# Patient Record
Sex: FEMALE | Race: WHITE | Employment: FULL TIME | URBAN - NONMETROPOLITAN AREA
[De-identification: names, ages, dates, MRNs, and addresses within clinical notes are randomized per-mention and may not be internally consistent; named-entity substitution may affect disease eponyms.]

---

## 2020-02-25 ENCOUNTER — HOSPITAL ENCOUNTER (INPATIENT)
Age: 45
LOS: 3 days | Discharge: HOME | DRG: 885 | End: 2020-02-28
Attending: PSYCHIATRY & NEUROLOGY | Admitting: PSYCHIATRY & NEUROLOGY
Payer: COMMERCIAL

## 2020-02-25 PROBLEM — F14.90 COCAINE USE: Status: ACTIVE | Noted: 2020-02-25

## 2020-02-25 PROBLEM — F33.2 SEVERE EPISODE OF RECURRENT MAJOR DEPRESSIVE DISORDER, WITHOUT PSYCHOTIC FEATURES (HCC): Status: ACTIVE | Noted: 2020-02-25

## 2020-02-25 PROBLEM — T39.1X2A INTENTIONAL ACETAMINOPHEN OVERDOSE (HCC): Status: RESOLVED | Noted: 2020-02-25 | Resolved: 2020-02-25

## 2020-02-25 PROBLEM — T42.4X2A INTENTIONAL BENZODIAZEPINE OVERDOSE (HCC): Status: ACTIVE | Noted: 2020-02-25

## 2020-02-25 PROBLEM — F33.1 MODERATE EPISODE OF RECURRENT MAJOR DEPRESSIVE DISORDER (HCC): Status: ACTIVE | Noted: 2020-02-25

## 2020-02-25 PROBLEM — T39.1X2A INTENTIONAL ACETAMINOPHEN OVERDOSE (HCC): Status: ACTIVE | Noted: 2020-02-25

## 2020-02-25 PROCEDURE — 6370000000 HC RX 637 (ALT 250 FOR IP): Performed by: NURSE PRACTITIONER

## 2020-02-25 PROCEDURE — 90792 PSYCH DIAG EVAL W/MED SRVCS: CPT | Performed by: NURSE PRACTITIONER

## 2020-02-25 PROCEDURE — 4500000002 HC ER NO CHARGE

## 2020-02-25 PROCEDURE — 1240000000 HC EMOTIONAL WELLNESS R&B

## 2020-02-25 RX ORDER — HYDROXYZINE PAMOATE 25 MG/1
25 CAPSULE ORAL 3 TIMES DAILY PRN
Status: DISCONTINUED | OUTPATIENT
Start: 2020-02-25 | End: 2020-02-28 | Stop reason: HOSPADM

## 2020-02-25 RX ORDER — DULOXETIN HYDROCHLORIDE 20 MG/1
20 CAPSULE, DELAYED RELEASE ORAL DAILY
Status: DISCONTINUED | OUTPATIENT
Start: 2020-02-25 | End: 2020-02-26

## 2020-02-25 RX ORDER — POLYETHYLENE GLYCOL 3350 17 G/17G
17 POWDER, FOR SOLUTION ORAL DAILY PRN
Status: DISCONTINUED | OUTPATIENT
Start: 2020-02-25 | End: 2020-02-28 | Stop reason: HOSPADM

## 2020-02-25 RX ORDER — DOXEPIN HYDROCHLORIDE 25 MG/1
25 CAPSULE ORAL NIGHTLY PRN
Status: DISCONTINUED | OUTPATIENT
Start: 2020-02-25 | End: 2020-02-28 | Stop reason: HOSPADM

## 2020-02-25 RX ORDER — ACETAMINOPHEN 325 MG/1
650 TABLET ORAL EVERY 4 HOURS PRN
Status: DISCONTINUED | OUTPATIENT
Start: 2020-02-25 | End: 2020-02-28 | Stop reason: HOSPADM

## 2020-02-25 RX ADMIN — DULOXETINE HYDROCHLORIDE 20 MG: 20 CAPSULE, DELAYED RELEASE ORAL at 15:07

## 2020-02-25 RX ADMIN — ESTROGENS, CONJUGATED 0.45 MG: 0.3 TABLET, FILM COATED ORAL at 17:19

## 2020-02-25 RX ADMIN — DOXEPIN HYDROCHLORIDE 25 MG: 25 CAPSULE ORAL at 21:48

## 2020-02-25 ASSESSMENT — SLEEP AND FATIGUE QUESTIONNAIRES
DO YOU HAVE DIFFICULTY SLEEPING: YES
DIFFICULTY ARISING: YES
DO YOU USE A SLEEP AID: YES
RESTFUL SLEEP: NO
DIFFICULTY STAYING ASLEEP: YES
AVERAGE NUMBER OF SLEEP HOURS: 4
DIFFICULTY FALLING ASLEEP: YES

## 2020-02-25 ASSESSMENT — LIFESTYLE VARIABLES: HISTORY_ALCOHOL_USE: YES

## 2020-02-25 ASSESSMENT — PATIENT HEALTH QUESTIONNAIRE - PHQ9: SUM OF ALL RESPONSES TO PHQ QUESTIONS 1-9: 18
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
## 2020-02-25 NOTE — PLAN OF CARE
Group Therapy Note     Date: 2/25/2020  Start Time: 9249  End Time:  1600  Number of Participants: 3     Type of Group: Recovery     Wellness Binder Information  Module Name:  relapse prevention  Session Number:  2     Patient's Goal:  identifying early warning signs     Notes:  pt acknowledged negative thinking can be an early warning signs to help prevent relapse.      Status After Intervention:  Improved     Participation Level: Interactive     Participation Quality: Appropriate, Attentive, and Sharing        Speech:  normal        Thought Process/Content: Logical        Affective Functioning: Congruent        Mood: congruent        Level of consciousness:  Alert, Oriented x4, and Attentive        Response to Learning: Able to verbalize current knowledge/experience        Endings: None Reported     Modes of Intervention: Education        Discipline Responsible: Psychoeducational Specialist        Signature:  Kota Wing

## 2020-02-25 NOTE — PLAN OF CARE
Group Therapy Note     Date: 2/25/2020  Start Time: 1430  End Time:  0429  Number of Participants: 3     Type of Group: Cognitive Skills     Wellness Binder Information  Module Name:  emotional wellness  Session Number:  1     Patient's Goal:  validation of feelings     Notes:  pt acknowledged to have feelings validated it may be necessary to share feeling with others.      Status After Intervention:  Improved     Participation Level: Interactive     Participation Quality: Appropriate, Attentive, and Sharing        Speech:  normal        Thought Process/Content: Logical        Affective Functioning: Congruent        Mood: congruent        Level of consciousness:  Alert, Oriented x4, and Attentive        Response to Learning: Able to verbalize current knowledge/experience        Endings: None Reported     Modes of Intervention: Education        Discipline Responsible: Psychoeducational Specialist        Signature:  Chava Michelle

## 2020-02-26 LAB
TSH REFLEX FT4: 1.41 UIU/ML (ref 0.35–5.5)
VITAMIN B-12: 545 PG/ML (ref 211–946)
VITAMIN D 25-HYDROXY: 26.7 NG/ML

## 2020-02-26 PROCEDURE — 1240000000 HC EMOTIONAL WELLNESS R&B

## 2020-02-26 PROCEDURE — 6370000000 HC RX 637 (ALT 250 FOR IP): Performed by: FAMILY MEDICINE

## 2020-02-26 PROCEDURE — 99231 SBSQ HOSP IP/OBS SF/LOW 25: CPT | Performed by: NURSE PRACTITIONER

## 2020-02-26 PROCEDURE — 82306 VITAMIN D 25 HYDROXY: CPT

## 2020-02-26 PROCEDURE — 84443 ASSAY THYROID STIM HORMONE: CPT

## 2020-02-26 PROCEDURE — 82607 VITAMIN B-12: CPT

## 2020-02-26 PROCEDURE — 6370000000 HC RX 637 (ALT 250 FOR IP): Performed by: NURSE PRACTITIONER

## 2020-02-26 PROCEDURE — 36415 COLL VENOUS BLD VENIPUNCTURE: CPT

## 2020-02-26 PROCEDURE — 6370000000 HC RX 637 (ALT 250 FOR IP): Performed by: PSYCHIATRY & NEUROLOGY

## 2020-02-26 RX ORDER — IBUPROFEN 400 MG/1
400 TABLET ORAL EVERY 6 HOURS PRN
Status: DISCONTINUED | OUTPATIENT
Start: 2020-02-26 | End: 2020-02-28 | Stop reason: HOSPADM

## 2020-02-26 RX ORDER — ERGOCALCIFEROL 1.25 MG/1
50000 CAPSULE ORAL WEEKLY
Status: DISCONTINUED | OUTPATIENT
Start: 2020-02-26 | End: 2020-02-28 | Stop reason: HOSPADM

## 2020-02-26 RX ORDER — DULOXETIN HYDROCHLORIDE 30 MG/1
30 CAPSULE, DELAYED RELEASE ORAL DAILY
Status: DISCONTINUED | OUTPATIENT
Start: 2020-02-27 | End: 2020-02-28 | Stop reason: HOSPADM

## 2020-02-26 RX ADMIN — DULOXETINE HYDROCHLORIDE 20 MG: 20 CAPSULE, DELAYED RELEASE ORAL at 08:08

## 2020-02-26 RX ADMIN — ESTROGENS, CONJUGATED 0.45 MG: 0.3 TABLET, FILM COATED ORAL at 08:08

## 2020-02-26 RX ADMIN — DOXEPIN HYDROCHLORIDE 25 MG: 25 CAPSULE ORAL at 21:12

## 2020-02-26 RX ADMIN — ERGOCALCIFEROL 50000 UNITS: 1.25 CAPSULE ORAL at 17:36

## 2020-02-26 RX ADMIN — ACETAMINOPHEN 650 MG: 325 TABLET ORAL at 09:39

## 2020-02-26 RX ADMIN — HYDROXYZINE PAMOATE 25 MG: 25 CAPSULE ORAL at 21:12

## 2020-02-26 ASSESSMENT — PAIN SCALES - GENERAL
PAINLEVEL_OUTOF10: 4
PAINLEVEL_OUTOF10: 2

## 2020-02-26 NOTE — PROGRESS NOTES
Group Therapy Note    Start Time: 964  End Time:  900  Number of Participants: 8    Type of Group: Community Meeting       Patient's Goal:  \"being and thinking more positive\"      Notes:      Participation Level:  Active Listener       Participation Quality: Appropriate      Thought Process/Content: Logical      Affective Functioning: Congruent      Mood: calm      Level of consciousness:  Alert      Modes of Intervention: Support      Discipline Responsible: Behavioral Health Tech II      Signature:  Ruthe Brunner

## 2020-02-26 NOTE — PROGRESS NOTES
[facility] Adult Unit Daily Assessment  Nursing Progress Note    Room: [facility]/607-01   Name: Karen Vo   Age: 40 y.o. Gender: female   Dx: Severe episode of recurrent major depressive disorder, without psychotic features ([facility] [address]  Precautions: suicide risk  Inpatient Status: voluntary       SLEEP:    Sleep Quality Fair  Sleep Medications: Yes   PRN Sleep Meds: No       MEDICAL:    Other PRN Meds: No   Med Compliant: Yes  Accu-Chek: No  Oxygen/CPAP/BiPAP: No  CIWA/CINA: No   PAIN Assessment: none, not receiving treatment  Side Effects from medication: No      PSYCH:    Depression: 3   Anxiety: 3   SI denies suicidal ideation   HI Negative for homicidal ideation      AVH:Absent      GENERAL:    Appetite: good    Social: Yes, sit out in day area talking with peer  Speech: normal   Appearance: appropriately dressed    GROUP:    Group Participation: Yes  Participation Quality: Active Listener    Notes: Patient has been sitting in day area with peer, interacts with peer and talking with peer. Patient is calm, cooperative during interview, makes good eye contact and answers questions appropriately. Patient arrived on unit earlier this am and reports that prior to admission she had not been sleeping well, that she took Ambien to help with sleep. Patient offered education on medication Doxepin, talked with patient in regards to medication. Overall patient is pleasant and cooperative, complies with medication regime, voices no concerns at this time.       Electronically signed by Bridget Marte LPN on 1/32/00 at 31:74 PM

## 2020-02-26 NOTE — PROGRESS NOTES
Requirement Note     SW met with pt to complete Psychosocial and CSSR-S on this date. Patients long and short term goals discussed. Pt voiced understanding. Treatment plan sheet signed. Pt verbalized understanding of the treatment plan. Pt participated in goals and objectives of the treatment plan. Pt completed safety plan with , pt received copy of plan, and original was placed into pt's chart. SW explained treatment goals with pt. Decreasing depression and anxiety by improvement of positive coping patterns was discussed. Pt acknowledged understanding of treatment goals and signed treatment plan signature sheet. In the last 6 months has the pt been a danger to self: YES  In the last 6 months has the pt been a danger to others: NO  Legal Guardian/POA: NO     Provided pt with [employer] Online handout entitled \"Quitting Smoking. \"  Reviewed handout with pt addressing dangers of smoking, developing coping skills, and providing basic information about quitting. Patient declined practical counseling of tobacco practical counseling during the hospital stay.
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
## 2020-02-26 NOTE — PROGRESS NOTES
Patient is calm and cooperative with care. Patient is social and attends groups. Patient is compliant with medications. Patient completed ADLs. Patient's appetite is good. Patient reported she slept well. Patient rates depression as a 3 and anxiety as 1 on the 0-10 scale. Patient denies SI, HI, and AVH.

## 2020-02-26 NOTE — PLAN OF CARE
Problem: Discharge Planning:  Goal: Discharged to appropriate level of care  Description  Discharged to appropriate level of care  Outcome: Ongoing     Problem: Health Maintenance - Impaired:  Goal: Ability to perform activities of daily living will improve  Description  Ability to perform activities of daily living will improve  Outcome: Ongoing  Goal: Able to sleep without medication for appropriate length of time  Description  Able to sleep without medication for appropriate length of time  Outcome: Ongoing  Goal: Maintenance of adequate nutrition will improve  Description  Maintenance of adequate nutrition will improve  Outcome: Ongoing     Problem: Mood - Altered:  Goal: Mood stable  Description  Mood stable  Outcome: Ongoing     Problem: Health Behavior:  Goal: Ability to verbalize adaptive coping strategies to use when suicidal feelings occur will improve  Description  Ability to verbalize adaptive coping strategies to use when suicidal feelings occur will improve  Outcome: Ongoing  Goal: Ability to verbalize adaptive coping strategies to use when the urge to self-mutilate occurs will improve  Description  Ability to verbalize adaptive coping strategies to use when the urge to self-mutilate occurs will improve  Outcome: Ongoing     Problem: Safety:  Goal: Ability to contract for his/her safety will improve  Description  Ability to contract for his/her safety will improve  Outcome: Ongoing
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
## 2020-02-27 PROCEDURE — 6370000000 HC RX 637 (ALT 250 FOR IP): Performed by: NURSE PRACTITIONER

## 2020-02-27 PROCEDURE — 1240000000 HC EMOTIONAL WELLNESS R&B

## 2020-02-27 PROCEDURE — 99231 SBSQ HOSP IP/OBS SF/LOW 25: CPT | Performed by: NURSE PRACTITIONER

## 2020-02-27 RX ADMIN — HYDROXYZINE PAMOATE 25 MG: 25 CAPSULE ORAL at 20:50

## 2020-02-27 RX ADMIN — DOXEPIN HYDROCHLORIDE 25 MG: 25 CAPSULE ORAL at 20:51

## 2020-02-27 RX ADMIN — ESTROGENS, CONJUGATED 0.45 MG: 0.3 TABLET, FILM COATED ORAL at 08:49

## 2020-02-27 RX ADMIN — DULOXETINE HYDROCHLORIDE 30 MG: 30 CAPSULE, DELAYED RELEASE ORAL at 08:49

## 2020-02-27 RX ADMIN — IBUPROFEN 400 MG: 400 TABLET ORAL at 20:50

## 2020-02-27 ASSESSMENT — PAIN SCALES - GENERAL: PAINLEVEL_OUTOF10: 3

## 2020-02-27 NOTE — PROGRESS NOTES
Group Note    Number of Participants in Group: 3  Number of Patients on Unit:9      Patient attended group:Yes  Reason for Absence:  Intervention for patient absence:        Type of Group:   Wrap-Up/Relaxation    Patient's Goal: See wrap up group sheet    Participation Level: Active listener and interactive          Patient Response to Learning: Yes    Patient's Behavior: Pleasant    Is Patient Social/Interacting: Yes    Relaxation:   Television:Yes   Reading:No   Game/Puzzle:No         Notes/Comments: pt sat and socialized with peers as well and also had slight concerns about another pt.  Reassured pt as well with the others and pt took it well and said felt safer       Please see patient's wrap up group sheet for patient's comments       Electronically signed by Catia De La Fuente on 2/26/20 at 9:47 PM

## 2020-02-27 NOTE — PLAN OF CARE
Problem: Health Behavior:  Goal: Ability to verbalize adaptive coping strategies to use when suicidal feelings occur will improve  Description  Ability to verbalize adaptive coping strategies to use when suicidal feelings occur will improve  2/27/2020 1232 by Michelle Vargas  Outcome: Ongoing  Note:                                                                       Group Therapy Note    Date: 2/27/2020  Start Time: 1000  End Time:  0693  Number of Participants: 7    Type of Group: Psychoeducation    Wellness Binder Information  Module Name:  Men's Issues  Session Number:  1    Group Goal for Pt: To improve knowledge of effective stress management techniques    Notes:  Pt demonstrated improved knowledge of effective stress management techniques by actively participating in group discussion.     Status After Intervention:  Unchanged    Participation Level: Interactive    Participation Quality: Attentive      Speech:  normal      Thought Process/Content: Logical      Affective Functioning: Congruent      Mood: anxious and depressed      Level of consciousness:  Alert and Oriented x4      Response to Learning: Able to verbalize current knowledge/experience, Able to verbalize/acknowledge new learning, and Progressing to goal      Endings: None Reported    Modes of Intervention: Education      Discipline Responsible: Psychoeducational Specialist      Signature:  Michelle Vargas

## 2020-02-27 NOTE — PROGRESS NOTES
appetite as \"it is always good. \" Patient reports no side effects from medications. Previous Psychiatric/Substance Use History      Medical History:  No past medical history on file. GUZMAN History:   Social History     Substance and Sexual Activity   Alcohol Use Not on file         Social History     Substance and Sexual Activity   Drug Use Not on file        Social History     Tobacco Use   Smoking Status Not on file        Family History:     No family history on file. Vital Signs:  Last set of tests and vitals:  Vitals:    02/27/20 0724   BP: 113/66   Pulse: 75   Resp: 14   Temp: 97 °F (36.1 °C)   SpO2: 98%          Mental Status:  Level of consciousness:  within normal limits and awake  Appearance:  well-appearing, street clothes, in chair, good grooming and good hygiene  Behavior/Motor:  no abnormalities noted  Attitude toward examiner:  cooperative, attentive and good eye contact  Speech:  normal rate and normal volume  Mood:  \"I am feeling better today. \"  Affect:  mood congruent  Thought processes:  linear and goal directed  Thought content:  Homocidal ideation :denies  Suicidal Ideation:  denies suicidal ideation  Delusions:  no evidence of delusions  Perceptual Disturbance:  denies any perceptual disturbance  Cognition:  oriented to person, place, and time  Concentration : fair  Memory intact for recent and remote  Fund of knowledge:  average  Abstract thinking:  adequate  Insight: good/improved  Judgment:  good     vitamin D  50,000 Units Oral Weekly    DULoxetine  30 mg Oral Daily    estrogens (conjugated)  0.45 mg Oral Daily       Current Medications:  Current Facility-Administered Medications   Medication Dose Route Frequency Provider Last Rate Last Dose    ibuprofen (ADVIL;MOTRIN) tablet 400 mg  400 mg Oral Q6H PRN MARIE Ramirez        vitamin D (ERGOCALCIFEROL) capsule 50,000 Units  50,000 Units Oral Weekly Mague Luis MD   50,000 Units at 02/26/20 8855    DULoxetine (CYMBALTA) extended release capsule 30 mg  30 mg Oral Daily MARIE Kinney   30 mg at 02/27/20 2200    acetaminophen (TYLENOL) tablet 650 mg  650 mg Oral Q4H PRN Deloise Jeans, MD   650 mg at 02/26/20 9619    polyethylene glycol (GLYCOLAX) packet 17 g  17 g Oral Daily PRN Deloise Jeans, MD        hydrOXYzine (VISTARIL) capsule 25 mg  25 mg Oral TID PRN MARIE Kinney   25 mg at 02/26/20 2112    doxepin (SINEQUAN) capsule 25 mg  25 mg Oral Nightly PRN MARIE Kinney   25 mg at 02/26/20 2112    estrogens (conjugated) (PREMARIN) tablet 0.45 mg  0.45 mg Oral Daily MARIE Kinney   0.45 mg at 02/27/20 9068       Psychotherapy:   SUPPORTIVE    DSM V Diagnoses:    Severe episode of recurrent major depressive disorder, without psychotic features ([facility] [address]  Intentional benzodiazepine overdose ([facility] [address]          Plan:    Encourage group therapy  15 minute safety checks  Medical monitoring by Dr. Lisseth Villeda and associates  Continue current therapy and medications   Possible discharge tomorrow   Social work to obtain collateral     Amount of time spent with patient:  15 minutes with greater than 50% of the time spent in counseling and collaboration of care.     MARIE Kinney  Clinician Signature: signed electronically

## 2020-02-27 NOTE — PROGRESS NOTES
BHI Daily Shift Assessment  Nursing Progress Note    Room: 0607/607-01 Name: Meet Sandhu Age: 40 y.o. Gender: female   Dx: Severe episode of recurrent major depressive disorder, without psychotic features ([facility] [address]  Precautions: suicide risk  Target Symptoms:   Accu-Chek: NoSleep: No,Sleep Quality Poor SI no plan AV denies [address] [place] [address]  ADLs: Yes Speech: normal Depression: 3 Anxiety: 1   Participation LevelActive Listener and Interactive  Appetite: Good    Participation QualityAppropriate, Attentive, Sharing and Supportive    Complaints:none    Notes: alert and oriented x4. Pleasant, calm and cooperative. Appearance and attire is clean and appropriate. Well groomed. Thought processes are linear and goal directed. Affect is bright and congruent with mood. Social and attending groups. Medication compliant. Appetite is good. Reports poor sleep.     Signature: Electronically signed by Enzo Moreira RN on 2/27/20 at 3:04 PM
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
## 2020-02-27 NOTE — PROGRESS NOTES
[facility] Adult Unit Daily Assessment  Nursing Progress Note    Room: 0607/607-01   Name: Mac Guerin   Age: 40 y.o. Gender: female   Dx: Severe episode of recurrent major depressive disorder, without psychotic features ([address]  Precautions: suicide risk  Inpatient Status: voluntary       SLEEP:    Sleep Quality Good  Sleep Medications: Yes   PRN Sleep Meds: Yes Doxepin      MEDICAL:    Other PRN Meds: No   Med Compliant: Yes  Accu-Chek: No  Oxygen/CPAP/BiPAP: No  CIWA/CINA: No   PAIN Assessment: none or not receiving treatment  Side Effects from medication: No      PSYCH:    Depression: improving   Anxiety: improving   SI denies suicidal ideation   HI Negative for homicidal ideation      AVH:Absent      GENERAL:    Appetite: good    Social: Yes   Speech: normal   Appearance: appropriately dressed    GROUP:    Group Participation: Yes  Participation Quality: Active Listener    Notes:     Patient is pleasant, calm and cooperative, with no behaviors noted. Reports that she slept good last night with Doxepin and had no sluggishness or being tired after administration. Reports that overall day has improved and feels more hopeful, reports that her depression and anxiety have improved and has goals for after discharge. Reviewed medication regime with patient per patient request and educated patient in regards to medication. Patient mood has improved. Answers questions appropriately, makes good eye contact. Prior to interview, patient had been a little anxious in regards to another peer, patient was reassured by staff and this nurse that staff will maintain and promote safety.       Electronically signed by Danae Vega LPN on 5/30/09 at 40:33 PM

## 2020-02-28 VITALS
HEIGHT: 60 IN | DIASTOLIC BLOOD PRESSURE: 80 MMHG | RESPIRATION RATE: 20 BRPM | BODY MASS INDEX: 27.48 KG/M2 | WEIGHT: 140 LBS | HEART RATE: 110 BPM | OXYGEN SATURATION: 96 % | SYSTOLIC BLOOD PRESSURE: 119 MMHG | TEMPERATURE: 96.1 F

## 2020-02-28 PROCEDURE — 6370000000 HC RX 637 (ALT 250 FOR IP): Performed by: NURSE PRACTITIONER

## 2020-02-28 PROCEDURE — 99238 HOSP IP/OBS DSCHRG MGMT 30/<: CPT | Performed by: NURSE PRACTITIONER

## 2020-02-28 PROCEDURE — 5130000000 HC BRIDGE APPOINTMENT

## 2020-02-28 RX ORDER — HYDROXYZINE PAMOATE 25 MG/1
25 CAPSULE ORAL 3 TIMES DAILY PRN
Qty: 30 CAPSULE | Refills: 0 | Status: SHIPPED | OUTPATIENT
Start: 2020-02-28 | End: 2020-03-13

## 2020-02-28 RX ORDER — DULOXETIN HYDROCHLORIDE 30 MG/1
30 CAPSULE, DELAYED RELEASE ORAL DAILY
Qty: 30 CAPSULE | Refills: 0 | Status: SHIPPED | OUTPATIENT
Start: 2020-02-29

## 2020-02-28 RX ORDER — DOXEPIN HYDROCHLORIDE 25 MG/1
25 CAPSULE ORAL NIGHTLY PRN
Qty: 30 CAPSULE | Refills: 0 | Status: SHIPPED | OUTPATIENT
Start: 2020-02-28

## 2020-02-28 RX ORDER — ERGOCALCIFEROL 1.25 MG/1
50000 CAPSULE ORAL WEEKLY
Qty: 5 CAPSULE | Refills: 0 | Status: SHIPPED | OUTPATIENT
Start: 2020-03-04

## 2020-02-28 RX ADMIN — ESTROGENS, CONJUGATED 0.45 MG: 0.3 TABLET, FILM COATED ORAL at 08:00

## 2020-02-28 RX ADMIN — DULOXETINE HYDROCHLORIDE 30 MG: 30 CAPSULE, DELAYED RELEASE ORAL at 08:00
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
## 2020-02-28 NOTE — PROGRESS NOTES
[facility] Adult Unit Daily Assessment  Nursing Progress Note     Room: [facility]/607-01   Name: Kaitlin Lake   Age: 40 y.o. Gender: female   Dx: Severe episode of recurrent major depressive disorder, without psychotic features ([facility] [address]  Precautions: suicide risk  Inpatient Status: voluntary         SLEEP:     Sleep Quality Good  Sleep Medications: Yes   PRN Sleep Meds: Yes         MEDICAL:     Other PRN Meds: No   Med Compliant: Yes  Accu-Chek: No  Oxygen/CPAP/BiPAP: No  CIWA/CINA: No   PAIN Assessment: none or not receiving treatment  Side Effects from medication: No        PSYCH:     Depression: 0   Anxiety: 3   SI denies suicidal ideation   HI Negative for homicidal ideation        AVH:Absent        GENERAL:     Appetite: good    Social: Yes   Speech: normal   Appearance: appropriately dressed     GROUP:     Group Participation: Yes  Participation Quality: Active Listener     Notes: Patient calm and cooperative with staff and peers. Will continue to monitor.     Electronically signed by Corrinne Pacini, RN on 2/27/2020 at 11:31 PM

## 2020-02-28 NOTE — PROGRESS NOTES
Progress Note  Julio Armenta  2/27/2020 11:00 PM  Subjective:   Admit Date:   2/25/2020      CC/ADMIT DX:       Interval History:   Reviewed overnight events and nursing notes. She has no medical concerns. I have reviewed all labs/diagnostics from the last 24hrs. ROS:   I have done a 10 point ROS and all are negative, except what is mentioned in the HPI. DIET GENERAL;    Medications:      vitamin D  50,000 Units Oral Weekly    DULoxetine  30 mg Oral Daily    estrogens (conjugated)  0.45 mg Oral Daily           Objective:   Vitals: /84   Pulse 89   Temp 98.3 °F (36.8 °C) (Temporal)   Resp 20   Ht 5' (1.524 m)   Wt 140 lb (63.5 kg)   SpO2 97%   BMI 27.34 kg/m²  No intake or output data in the 24 hours ending 02/27/20 2300  General appearance: alert and cooperative with exam  Lungs: clear to auscultation bilaterally  Heart: RRR  Abdomen: soft, non-tender; bowel sounds normal; no masses,  no organomegaly  Extremities: extremities normal, atraumatic, no cyanosis or edema  Neurologic:  No obvious focal neurologic deficits. Assessment and Plan:   Principal Problem:    Severe episode of recurrent major depressive disorder, without psychotic features ([address]  Active Problems:    Intentional benzodiazepine overdose ([address]    Cocaine use    Moderate episode of recurrent major depressive disorder ([address]  Resolved Problems:    Intentional acetaminophen overdose ([address]    Vit D Def    Plan:  1. Continue present medication(s)   2. Follow with Psych  3. She is medically stable. I will monitor for any changes or concerns. Discharge planning:   her home     Reviewed treatment plans with the patient and/or family.              Electronically signed by Dionne Woodard MD on 2/27/2020 at 11:00 PM

## 2020-02-28 NOTE — PROGRESS NOTES
Treatment Team Note:    LCSW met with [address] [address] team to discuss Pts [address] plans. Progress/Behavior/Group Attendance: yes    Target Symptoms/Reason for admission: depression, suicidal ideation    Diagnoses: severe episode of recurrent major depressive disorder, without psychotic features, suicidal ideation    UDS: Neg     BAL: Neg    AftercarePlan: [address] lives with: SW will meet with pt to gather information. Collateral obtained from: SW will meet with pt to gather release of information.   On:    Family Session: PHOEBE    Misc:

## 2020-02-28 NOTE — PLAN OF CARE
Problem: Health Behavior:  Goal: Ability to verbalize adaptive coping strategies to use when suicidal feelings occur will improve  Description  Ability to verbalize adaptive coping strategies to use when suicidal feelings occur will improve   2/28/2020 1129 by Tam Medina  Outcome: Ongoing  Note:                                                                       Group Therapy Note    Date: 2/28/2020  Start Time: 1000  End Time:  7818  Number of Participants: 10    Type of Group: Psychoeducation    Wellness Binder Information  Module Name:  Relapse Prevention  Session Number:  5    Group Goal for Pt: To improve knowledge of relapse prevention strategies    Notes:  Pt demonstrated improved knowledge of relapse prevention strategies by actively participating in group discussion.     Status After Intervention:  Unchanged    Participation Level: Interactive    Participation Quality: Attentive      Speech:  normal      Thought Process/Content: Logical      Affective Functioning: Congruent      Mood: anxious and depressed      Level of consciousness:  Alert and Oriented x4      Response to Learning: Able to verbalize current knowledge/experience, Able to verbalize/acknowledge new learning, and Progressing to goal      Endings: None Reported    Modes of Intervention: Education      Discipline Responsible: Psychoeducational Specialist      Signature:  Tam Medina
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
## 2020-02-28 NOTE — PROGRESS NOTES
BHI Daily Shift Assessment  Nursing Progress Note    Room: 06/607-01 Name: Marquis Patel Age: 40 y.o. Gender: female   Dx: Severe episode of recurrent major depressive disorder, without psychotic features ([facility] [address]  Precautions: suicide risk  Target Symptoms:   Accu-Chek: NoSleep: No,Sleep Quality Good SI no plan AV denies [address] [place] [address]  ADLs: Yes Speech: normal Depression: 0 Anxiety: 0   Participation LevelActive Listener and Interactive  Appetite: Good    Participation QualityAppropriate, Attentive, Sharing and Supportive    Complaints:none    Notes: alert and oriented x 4. Pleasant, calm and cooperative. Appearance and attire is clean and appropriate. Well groomed. Thought processes are linear and goal directed. Affect is bright and congruent with mood. Social and attending group.medication compliant. Reports good sleep, appetite is good.      Signature: Electronically signed by Jaydon Chacon RN on 2/28/20 at 9:00 AM